# Patient Record
(demographics unavailable — no encounter records)

---

## 2025-06-21 NOTE — END OF VISIT
[] : Fellow [Fellow] : Fellow [FreeTextEntry3] :  I personally saw and examined this patient with the fellow physician and was present for the key portions of history taking, examination as well as the Mohs procedures performed .  I agree with the assessment and plan as documented in the fellow's note unless noted below.

## 2025-06-21 NOTE — PHYSICAL EXAM
[Alert] : alert [Oriented x 3] : ~L oriented x 3 [Well Nourished] : well nourished [Conjunctiva Non-injected] : conjunctiva non-injected [No Visual Lymphadenopathy] : no visual  lymphadenopathy [No Clubbing] : no clubbing [No Edema] : no edema [No Bromhidrosis] : no bromhidrosis [No Chromhidrosis] : no chromhidrosis [Hair] : Hair [Scalp] : Scalp [Face] : Face [Nose] : Nose [Eyelids] : Eyelids [Ears] : Ears [R Leg] : R Leg [L Leg] : L Leg [FreeTextEntry3] : Left proximal pretibial - 4.1 cmx 3.0 cm pink crusted plaque No regional LAD

## 2025-06-21 NOTE — PROCEDURE
[TextEntry] : Mohs Surgery Procedure Note   A surgical time out was taken to confirm the patient's correct identity and the correct site. The operative site was identified by the patient and surgical team prior to surgery and the patient agreed to proceed with the surgical site which was marked prior to anesthesia infiltration.   Mohs Micrographic Surgery Report Date Collected: 06/18/2025 Date Received: Same as above Date Verified: Same as above Attending Surgeon: Kasia Ledezma MD Fellow: Salud Martinez MD Assistants: Kyrie Segal RN, Maryuri Tang MA,  Sonia Mcfarland MA Procedure#:  Diagnosis: SCC Location: left proximal pretibial Pre-op Size: 4.1 cmx 3.0 cm Post-Operative Final Defect Size: 5.0 cm x  5.0  cm Stages (number of pieces per stage):1 (4/1) Pathology, if tumor noted in stages: Debulk with atypical keratinocytic proliferation c/w invasive SCC. Stage I with Sparse perivascular lymphocytic infiltrate without evidence of residual carcinoma on sections examined Indications for procedure: Ill-defined tumor margins, high-priority anatomic location for preservation of normal tissue, aggressive histologic nature of the tumor Repair type: N/A (second intent) Total volume of anesthesia: 6 mL   Mohs Procedure Report:   The patient was escorted to the outpatient surgical operatory. The proposed Mohs procedure and reconstruction options were discussed with the patient. The risks, benefits, and alternatives were discussed and the patient signed a written consent form. A time out was taken to confirm the patient's identity and the exact location of the skin cancer. After the patient was placed in a recumbent position, the surgical site was cleaned with alcohol and either Betadine (for eyes and ears) or chlorhexidine gluconate, draped, and infiltrated with 0.5% lidocaine with 1:200,000 epinephrine local anesthetic. A sterile surgical marking pen was used to outline a thin margin of normal-appearing skin around the tumor. A beveled incision was then made using a scalpel. Small orienting nicks were made on the specimen and the surrounding skin. The tissue was then sharply dissected from the surrounding skin. Hemostasis was maintained with the electrosurgical unit and/or pressure. A temporary dressing was placed on the surgical defect until the frozen section slides were interpreted. The oriented specimen was placed in a Deion dish and transported to the Mohs laboratory. For each stage of the procedure, a visual representation of the specimen was drawn on a Mohs map. This map graphically depicts the specimen's two-dimensional appearance, orientation, and tissue preparation, which consists of dividing the specimen and applying tissue dyes. Because the deep and peripheral portions of the tissue are then embedded in the same geometric plane, the map also represents an oriented scale drawing of the resulting histologic sections. The Mohs technician then prepared frozen section slides using standard techniques. The slides were stained with hematoxylin and eosin, and cover slips were applied. The frozen section slides were then examined under the microscope. If tumor was found, it was localized on the map. The orienting nicks on the original specimen corresponded to similar nicks on the surgical defect so areas of identified tumor could be mapped back to the patient and resected. Additional layers of removed skin were then processed as indicated above. This iterative process continued as applicable until no tumor was observed microscopically. At this stage, the Mohs resection was complete.   Second Intention Healing After the surgical procedure was completed, the patient was brought back to the minor surgery operatory. Various reconstructive modalities were discussed with the patient. Second intention healing was selected as the best option. A pressure dressing composed of Vaseline ointment, Telfa, and sterile gauze and was securely taped into place. The patient was given complete verbal and written wound care instructions and was asked to follow up for a wound check as indicated. The patient ambulated from the minor surgery operatory without problems.

## 2025-06-21 NOTE — ASSESSMENT
[FreeTextEntry1] : Mohs surgery for SCC left proximal pretibial -- 1 stage(s) of Mohs surgery performed 06/18/2025 -- Heal by second intent  -- f/u for wound check 1 week at time of second Mohs procedure -- Augmentin bid x 7 days -- f/u for routine skin exams as previously recommended by Her referring dermatologist.   Thank you for this Mohs surgery referral.   Reason MD [place] Physician, Dermatology & Dermatologic Surgery [facility].

## 2025-06-21 NOTE — HISTORY OF PRESENT ILLNESS
[FreeTextEntry1] : Invasive SCC left proximal pretibial region [de-identified] : 06/18/2025   Referred by: Dr. Thomas   We had the pleasure of seeing your patient in consultation for Mohs Micrographic Surgery.  Ms. TANYA CASALINI VALENCIA is a 57 year old F who presents for SCC left proximal pretibial region. Bx done 5/15/2025 showing invasive SCC, had been a quickly growing nodule x weeks prior to biopsy. No treatment to date.  -of note, has a separate BCC chest scheduled for Mohs next week   Pertinent positives noted below History of HIV or hepatitis: No Blood thinners: None Antibiotic Prophylaxis: None Medical implants: None   The patient's review of systems questionnaire was reviewed. Education needs were identified. There were no barriers to learning.   Mohs surgery consultation for SCC left proximal pretibial region   -- I explained the treatment options of topical immunomodulatory or chemotherapy, electrodessication and curettage, radiation therapy, excision and Mohs surgery. I recommended Mohs surgery due to the tumor type, location, and ill-defined nature of cancer.   Mohs Appropriate Use Criteria (AUC) Score: 8   I explained that following extirpation there will be a full thickness defect of the involved area. The reconstructive options will be based on the defect size and surrounding tissue laxity of the involved area. Primary closure is only possible for smaller defects. For larger defects, local tissue rearrangement or skin grafting may be necessary. Risks following layered primary closure or local tissue rearrangement include wound dehiscence, contour irregularity, bleeding, infection, and paresthesia (nerve damage including sensory deficit or motor weakness). Risks following skin grafting include wound dehiscence, skin graft nonadherence (partial or complete), contour irregularity, bleeding, infection, paresthesia, and donor site complications. I explained that the patient will need to abstain from physical activity for 1-2 weeks following the surgery, that they would heal with a scar, and also discussed the chances of infection, bleeding, potential sensory or motor nerve damage, and recurrence. The patient indicated that s/he understood the risks and wished to proceed today -- In particular, for reconstruction we discussed healing via second intent   Thank you for this Mohs surgery referral. We recommended that Ms. TANYA CASALINI VALENCIA follow up with Her referring dermatologist for routine skin exams following surgery.

## 2025-06-21 NOTE — HISTORY OF PRESENT ILLNESS
[FreeTextEntry1] : Invasive SCC left proximal pretibial region [de-identified] : 06/18/2025   Referred by: Dr. Thomas   We had the pleasure of seeing your patient in consultation for Mohs Micrographic Surgery.  Ms. TANYA CASALINI VALENCIA is a 57 year old F who presents for SCC left proximal pretibial region. Bx done 5/15/2025 showing invasive SCC, had been a quickly growing nodule x weeks prior to biopsy. No treatment to date.  -of note, has a separate BCC chest scheduled for Mohs next week   Pertinent positives noted below History of HIV or hepatitis: No Blood thinners: None Antibiotic Prophylaxis: None Medical implants: None   The patient's review of systems questionnaire was reviewed. Education needs were identified. There were no barriers to learning.   Mohs surgery consultation for SCC left proximal pretibial region   -- I explained the treatment options of topical immunomodulatory or chemotherapy, electrodessication and curettage, radiation therapy, excision and Mohs surgery. I recommended Mohs surgery due to the tumor type, location, and ill-defined nature of cancer.   Mohs Appropriate Use Criteria (AUC) Score: 8   I explained that following extirpation there will be a full thickness defect of the involved area. The reconstructive options will be based on the defect size and surrounding tissue laxity of the involved area. Primary closure is only possible for smaller defects. For larger defects, local tissue rearrangement or skin grafting may be necessary. Risks following layered primary closure or local tissue rearrangement include wound dehiscence, contour irregularity, bleeding, infection, and paresthesia (nerve damage including sensory deficit or motor weakness). Risks following skin grafting include wound dehiscence, skin graft nonadherence (partial or complete), contour irregularity, bleeding, infection, paresthesia, and donor site complications. I explained that the patient will need to abstain from physical activity for 1-2 weeks following the surgery, that they would heal with a scar, and also discussed the chances of infection, bleeding, potential sensory or motor nerve damage, and recurrence. The patient indicated that s/he understood the risks and wished to proceed today -- In particular, for reconstruction we discussed healing via second intent   Thank you for this Mohs surgery referral. We recommended that Ms. TANYA CASALINI VALENCIA follow up with Her referring dermatologist for routine skin exams following surgery.

## 2025-06-21 NOTE — ASSESSMENT
[FreeTextEntry1] : Mohs surgery for SCC left proximal pretibial -- 1 stage(s) of Mohs surgery performed 06/18/2025 -- Heal by second intent  -- f/u for wound check 1 week at time of second Mohs procedure -- Augmentin bid x 7 days -- f/u for routine skin exams as previously recommended by Her referring dermatologist.   Thank you for this Mohs surgery referral.   Reason MD [place] Physician, Dermatology & Dermatologic Surgery [facility].

## 2025-06-24 NOTE — ASSESSMENT
[FreeTextEntry1] : Mohs surgery for SCC left proximal pretibial -- 1 stage(s) of Mohs surgery performed 06/18/2025 -- Heal by second intent  -- f/u for wound check 1 week at time of second Mohs procedure -- Augmentin bid x 7 days -- f/u for routine skin exams as previously recommended by Her referring dermatologist.   Thank you for this Mohs surgery referral.   Reason MD [place] Physician, Dermatology & Dermatologic Surgery [facility].

## 2025-06-24 NOTE — HISTORY OF PRESENT ILLNESS
[FreeTextEntry1] : BCC chest  [de-identified] : 06/24/2025   Referred by: Dr. Thomas   We had the pleasure of seeing your patient for Mohs Micrographic Surgery.  Ms. TANYA CASALINI VALENCIA is a 57 year old F who presents for BCC chest   s/p Mohs for SCC left proximal pretibial region 6/18/2025. Bx done 5/15/2025 showing invasive SCC, had been a quickly growing nodule x weeks prior to biopsy. No treatment to date.   Pertinent positives noted below History of HIV or hepatitis: No Blood thinners: None Antibiotic Prophylaxis: None Medical implants: None   The patient's review of systems questionnaire was reviewed. Education needs were identified. There were no barriers to learning.   Mohs surgery consultation for BCC chest   -- I explained the treatment options of topical immunomodulatory or chemotherapy, electrodessication and curettage, radiation therapy, excision and Mohs surgery. I recommended Mohs surgery due to the tumor type, location, and ill-defined nature of cancer.   Mohs Appropriate Use Criteria (AUC) Score: 8   I explained that following extirpation there will be a full thickness defect of the involved area. The reconstructive options will be based on the defect size and surrounding tissue laxity of the involved area. Primary closure is only possible for smaller defects. For larger defects, local tissue rearrangement or skin grafting may be necessary. Risks following layered primary closure or local tissue rearrangement include wound dehiscence, contour irregularity, bleeding, infection, and paresthesia (nerve damage including sensory deficit or motor weakness). Risks following skin grafting include wound dehiscence, skin graft nonadherence (partial or complete), contour irregularity, bleeding, infection, paresthesia, and donor site complications. I explained that the patient will need to abstain from physical activity for 1-2 weeks following the surgery, that they would heal with a scar, and also discussed the chances of infection, bleeding, potential sensory or motor nerve damage, and recurrence. The patient indicated that s/he understood the risks and wished to proceed today -- In particular, for reconstruction we discussed via linear    Thank you for this Mohs surgery referral. We recommended that Ms. TANYA CASALINI VALENCIA follow up with Her referring dermatologist for routine skin exams following surgery.

## 2025-06-24 NOTE — PHYSICAL EXAM
[Alert] : alert [Oriented x 3] : ~L oriented x 3 [Well Nourished] : well nourished [Conjunctiva Non-injected] : conjunctiva non-injected [No Visual Lymphadenopathy] : no visual  lymphadenopathy [No Clubbing] : no clubbing [No Edema] : no edema [No Bromhidrosis] : no bromhidrosis [No Chromhidrosis] : no chromhidrosis [Hair] : Hair [Scalp] : Scalp [Face] : Face [Nose] : Nose [Eyelids] : Eyelids [Ears] : Ears [R Leg] : R Leg [L Leg] : L Leg [FreeTextEntry3] : Left proximal pretibial -scar R chest No regional LAD

## 2025-06-24 NOTE — PROCEDURE
[TextEntry] : Mohs Surgery Procedure Note   A surgical time out was taken to confirm the patient's correct identity and the correct site. The operative site was identified by the patient and surgical team prior to surgery and the patient agreed to proceed with the surgical site which was marked prior to anesthesia infiltration.   Mohs Micrographic Surgery Report Date Collected: 06/24/2025 Date Received: Same as above Date Verified: Same as above Attending Surgeon: Kasia Ledezma MD Fellow: Salud Martinez MD Assistants: Kyrie Segal RN, Maryuri Tang MA,  Sonia Mcfarland MA Procedure#: *** Diagnosis: *** Location: *** Pre-op Size: ***cm Post-Operative Final Defect Size: *** cm Stages (number of pieces per stage): *** (***/***) Pathology, if tumor noted in stages: Debulk with ***. Stage I with Sparse perivascular lymphocytic infiltrate without evidence of residual carcinoma on sections examined Indications for procedure: Ill-defined tumor margins, high-priority anatomic location for preservation of normal tissue, aggressive histologic nature of the tumor Repair type: *** / Repair to be performed by  *** Subcutaneous and dermal sutures used: *** Monocryl/Vicryl.     N/A - Repair to be performed by  *** Epidermal sutures: *** Prolene/Chromic N/A - Repair to be performed by  *** Total volume of anesthesia: *** mL Repair size or area: *** cm   Mohs Procedure Report:   The patient was escorted to the outpatient surgical operatory. The proposed Mohs procedure and reconstruction options were discussed with the patient. The risks, benefits, and alternatives were discussed and the patient signed a written consent form. A time out was taken to confirm the patient's identity and the exact location of the skin cancer. After the patient was placed in a recumbent position, the surgical site was cleaned with alcohol and either Betadine (for eyes and ears) or chlorhexidine gluconate, draped, and infiltrated with 0.5% lidocaine with 1:200,000 epinephrine local anesthetic. A sterile surgical marking pen was used to outline a thin margin of normal-appearing skin around the tumor. A beveled incision was then made using a scalpel. Small orienting nicks were made on the specimen and the surrounding skin. The tissue was then sharply dissected from the surrounding skin. Hemostasis was maintained with the electrosurgical unit and/or pressure. A temporary dressing was placed on the surgical defect until the frozen section slides were interpreted. The oriented specimen was placed in a Deion dish and transported to the Mohs laboratory. For each stage of the procedure, a visual representation of the specimen was drawn on a Mohs map. This map graphically depicts the specimen's two-dimensional appearance, orientation, and tissue preparation, which consists of dividing the specimen and applying tissue dyes. Because the deep and peripheral portions of the tissue are then embedded in the same geometric plane, the map also represents an oriented scale drawing of the resulting histologic sections. The Mohs technician then prepared frozen section slides using standard techniques. The slides were stained with hematoxylin and eosin, and cover slips were applied. The frozen section slides were then examined under the microscope. If tumor was found, it was localized on the map. The orienting nicks on the original specimen corresponded to similar nicks on the surgical defect so areas of identified tumor could be mapped back to the patient and resected. Additional layers of removed skin were then processed as indicated above. This iterative process continued as applicable until no tumor was observed microscopically. At this stage, the Mohs resection was complete.

## 2025-07-10 NOTE — HISTORY OF PRESENT ILLNESS
[FreeTextEntry1] : Wound check s/p Mohs surgery for invasive SCC left proximal pretibial region 6/18/ [de-identified] : 07/09/2025   Ms. TANYA CASALINI VALENCIA is a 57 year old F who presents for wound check s/p Mohs surgery for SCC left proximal pretibial region 6/18/2025. Has been covering with mupirocin and nonstick dressing. Notes clear liquid drainage from the wound, no pain.    Patient denies fever, chills, night sweats, unintentional weight loss or other constitutional symptoms

## 2025-07-10 NOTE — END OF VISIT
[] : Fellow [Fellow] : Fellow [FreeTextEntry3] : I personally saw and examined this patient with the fellow physician and was present for the key portions of history taking, examination as well as any procedures performed .  I agree with the assessment and plan as documented in the resident's note unless noted below.   Kasia Ledezma MD Physician, Dermatology and Dermatologic Surgery [facility]

## 2025-07-10 NOTE — ASSESSMENT
[FreeTextEntry1] : Wound Check s/p Mohs surgery for SCC left proximal pretibial -- 1 stage(s) of Mohs surgery performed 06/18/2025 -wound edges gently debrided with curette today -- referral to Wound Care placed for dressing and wound care recommendations -RTC in 3 weeks -- f/u for routine skin exams as previously recommended by Her referring dermatologist.   Thank you for this Mohs surgery referral.   Kasia Ledezma MD Physician, Dermatology & Dermatologic Surgery [facility].

## 2025-07-10 NOTE — PHYSICAL EXAM
[Alert] : alert [Oriented x 3] : ~L oriented x 3 [Well Nourished] : well nourished [Conjunctiva Non-injected] : conjunctiva non-injected [No Visual Lymphadenopathy] : no visual  lymphadenopathy [No Clubbing] : no clubbing [No Edema] : no edema [No Bromhidrosis] : no bromhidrosis [No Chromhidrosis] : no chromhidrosis [Hair] : Hair [Scalp] : Scalp [Face] : Face [Nose] : Nose [Eyelids] : Eyelids [Ears] : Ears [R Leg] : R Leg [L Leg] : L Leg [FreeTextEntry3] : Left proximal pretibial wound with granulation tissue, clear serous drainage -exudate at wound edges debrided with a curette 1+ edema bilateral lower legs

## 2025-07-10 NOTE — END OF VISIT
[] : Fellow [Fellow] : Fellow [FreeTextEntry3] : I personally saw and examined this patient with the fellow physician and was present for the key portions of history taking, examination as well as any procedures performed .  I agree with the assessment and plan as documented in the resident's note unless noted below.   Kasia Ledezma MD Physician, Dermatology and Dermatologic Surgery [facility]

## 2025-07-10 NOTE — ASSESSMENT
[FreeTextEntry1] : Wound Check s/p Mohs surgery for SCC left proximal pretibial -- 1 stage(s) of Mohs surgery performed 06/18/2025 -wound edges gently debrided with curette today -- referral to Wound Care placed for dressing and wound care recommendations -RTC in 3 weeks -- f/u for routine skin exams as previously recommended by Her referring dermatologist.   Thank you for this Mohs surgery referral.   Kasia Ledezma MD Physician, Dermatology & Dermatologic Surgery [facility].

## 2025-07-10 NOTE — HISTORY OF PRESENT ILLNESS
[FreeTextEntry1] : Wound check s/p Mohs surgery for invasive SCC left proximal pretibial region 6/18/ [de-identified] : 07/09/2025   Ms. TANYA CASALINI VALENCIA is a 57 year old F who presents for wound check s/p Mohs surgery for SCC left proximal pretibial region 6/18/2025. Has been covering with mupirocin and nonstick dressing. Notes clear liquid drainage from the wound, no pain.    Patient denies fever, chills, night sweats, unintentional weight loss or other constitutional symptoms

## 2025-07-16 NOTE — ASSESSMENT
[FreeTextEntry1] : 1) Mohs surgery for BCC right medial superior chest -- 1 stage(s) of Mohs surgery performed 07/16/2025 -- Primary intermediate repair performed -- f/u for wound check PRN -- Mupirocin oint bid -- f/u for routine skin exams as previously recommended by Her referring dermatologist.   2) Wound Check s/p Mohs surgery for SCC left proximal pretibial -- 1 stage(s) of Mohs surgery performed 06/18/2025 -- Healing by second intent  -wound with some exudate, gently debrided today -- recommended Wound Care consultation to discuss recommendations to optimize healing -RTC in 3 weeks -- f/u for routine skin exams as previously recommended by Her referring dermatologist.   Thank you for this Mohs surgery referral.   Kasia Ledezma MD Physician, Dermatology & Dermatologic Surgery [facility].

## 2025-07-16 NOTE — ASSESSMENT
[FreeTextEntry1] : 1) Mohs surgery for BCC right medial superior chest -- 1 stage(s) of Mohs surgery performed 07/16/2025 -- Primary intermediate repair performed -- f/u for wound check PRN -- Mupirocin oint bid -- f/u for routine skin exams as previously recommended by Her referring dermatologist.   2) Wound Check s/p Mohs surgery for SCC left proximal pretibial -- 1 stage(s) of Mohs surgery performed 06/18/2025 -- Healing by second intent  -wound with some exudate, gently debrided today -- recommended Wound Care consultation to discuss recommendations to optimize healing -RTC in 3 weeks -- f/u for routine skin exams as previously recommended by Her referring dermatologist.   Thank you for this Mohs surgery referral.   Kasia Ledezma MD Physician, Dermatology & Dermatologic Surgery [facility].

## 2025-07-16 NOTE — HISTORY OF PRESENT ILLNESS
[FreeTextEntry1] : Infiltrative BCC Right Medial Superior chest, Wound check s/p Mohs and second intent for SCC left distal pretibial 6/18/25 [de-identified] : 07/16/2025   Referred by: Dr. Thomas   We had the pleasure of seeing your patient for Mohs Micrographic Surgery.  Ms. TANYA CASALINI VALENCIA is a 57 year old F who presents for Mohs for an infiltrative BCC right medial superior chest. Had been there as a pink bump prior to bx for several mos. No treatment to date.  She is also recently s/p Mohs for SCC left proximal pretibial region 6/18/2025. Healing via second intent, keeping covered with mupirocin . Notes it is filling in well but has some clear drainage.   Pertinent positives noted below History of HIV or hepatitis: No Blood thinners: None Antibiotic Prophylaxis: None Medical implants: None   The patient's review of systems questionnaire was reviewed. Education needs were identified. There were no barriers to learning.   Mohs surgery consultation for Infiltrative BCC right medial superior chest   -- I explained the treatment options of topical immunomodulatory or chemotherapy, electrodessication and curettage, radiation therapy, excision and Mohs surgery. I recommended Mohs surgery due to the tumor type, location, and ill-defined nature of cancer.   Mohs Appropriate Use Criteria (AUC) Score: 8   I explained that following extirpation there will be a full thickness defect of the involved area. The reconstructive options will be based on the defect size and surrounding tissue laxity of the involved area. Primary closure is only possible for smaller defects. For larger defects, local tissue rearrangement or skin grafting may be necessary. Risks following layered primary closure or local tissue rearrangement include wound dehiscence, contour irregularity, bleeding, infection, and paresthesia (nerve damage including sensory deficit or motor weakness). Risks following skin grafting include wound dehiscence, skin graft nonadherence (partial or complete), contour irregularity, bleeding, infection, paresthesia, and donor site complications. I explained that the patient will need to abstain from physical activity for 1-2 weeks following the surgery, that they would heal with a scar, and also discussed the chances of infection, bleeding, potential sensory or motor nerve damage, and recurrence. The patient indicated that s/he understood the risks and wished to proceed today -- In particular, for reconstruction we discussed linear closure   Thank you for this Mohs surgery referral. We recommended that Ms. TANYA CASALINI VALENCIA follow up with Her referring dermatologist for routine skin exams following surgery.

## 2025-07-16 NOTE — PHYSICAL EXAM
[Alert] : alert [Oriented x 3] : ~L oriented x 3 [Well Nourished] : well nourished [Conjunctiva Non-injected] : conjunctiva non-injected [No Visual Lymphadenopathy] : no visual  lymphadenopathy [No Clubbing] : no clubbing [No Edema] : no edema [No Bromhidrosis] : no bromhidrosis [No Chromhidrosis] : no chromhidrosis [Hair] : Hair [Scalp] : Scalp [Face] : Face [Nose] : Nose [Eyelids] : Eyelids [Ears] : Ears [R Leg] : R Leg [L Leg] : L Leg [FreeTextEntry3] : Right medial superior chest - 2.3 x 1.4 cm pink plaque No regional LAD  -left pretibial with healing 3.5 cm x 3.2 cm wound, shallower than prior with abundant granulation tissue -exudate on the wound gently debrided with curette today

## 2025-07-16 NOTE — PROCEDURE
[TextEntry] : Mohs Surgery Procedure Note   A surgical time out was taken to confirm the patient's correct identity and the correct site. The operative site was identified by the patient and surgical team prior to surgery and the patient agreed to proceed with the surgical site which was marked prior to anesthesia infiltration.   Mohs Micrographic Surgery Report Date Collected: 07/16/2025 Date Received: Same as above Date Verified: Same as above Attending Surgeon: Kasia Ledezma MD Fellow: Salud Martinez MD Assistants: Kyrie Segal RN, Maryuri Tang MA,  Sonia Mcfarland MA Procedure#:  Diagnosis: BCC Location: Right medial superior chest Pre-op Size: 2.3 cm x 1.4 cm Post-Operative Final Defect Size: 4.0 cm x 3.1 cm Stages (number of pieces per stage): 1 (2/1) Pathology, if tumor noted in stages: Debulk with atypical basaloid proliferation c/w BCC. Stage I with Sparse perivascular lymphocytic infiltrate without evidence of residual carcinoma on sections examined Indications for procedure: Ill-defined tumor margins, high-priority anatomic location for preservation of normal tissue, aggressive histologic nature of the tumor Repair type: Primary intermediate linear layered Subcutaneous and dermal sutures used: 3-0 Vicryl.      Epidermal sutures: 5-0 fast gut Total volume of anesthesia: 12 mL Repair length: 7.2 cm   Mohs Procedure Report:   The patient was escorted to the outpatient surgical operatory. The proposed Mohs procedure and reconstruction options were discussed with the patient. The risks, benefits, and alternatives were discussed and the patient signed a written consent form. A time out was taken to confirm the patient's identity and the exact location of the skin cancer. After the patient was placed in a recumbent position, the surgical site was cleaned with alcohol and either Betadine (for eyes and ears) or chlorhexidine gluconate, draped, and infiltrated with 0.5% lidocaine with 1:200,000 epinephrine local anesthetic. A sterile surgical marking pen was used to outline a thin margin of normal-appearing skin around the tumor. A beveled incision was then made using a scalpel. Small orienting nicks were made on the specimen and the surrounding skin. The tissue was then sharply dissected from the surrounding skin. Hemostasis was maintained with the electrosurgical unit and/or pressure. A temporary dressing was placed on the surgical defect until the frozen section slides were interpreted. The oriented specimen was placed in a Deion dish and transported to the Mohs laboratory. For each stage of the procedure, a visual representation of the specimen was drawn on a Mohs map. This map graphically depicts the specimen's two-dimensional appearance, orientation, and tissue preparation, which consists of dividing the specimen and applying tissue dyes. Because the deep and peripheral portions of the tissue are then embedded in the same geometric plane, the map also represents an oriented scale drawing of the resulting histologic sections. The Mohs technician then prepared frozen section slides using standard techniques. The slides were stained with hematoxylin and eosin, and cover slips were applied. The frozen section slides were then examined under the microscope. If tumor was found, it was localized on the map. The orienting nicks on the original specimen corresponded to similar nicks on the surgical defect so areas of identified tumor could be mapped back to the patient and resected. Additional layers of removed skin were then processed as indicated above. This iterative process continued as applicable until no tumor was observed microscopically. At this stage, the Mohs resection was complete.   RECONSTRUCTION PROCEDURE NOTE INTERMEDIATE LINEAR LAYERED CLOSURE   Prior to beginning the procedure, patient identity was verified, as well as the procedure to be performed and the site.  All equipment required was ready and available. The patient was positioned appropriately.   INDICATIONS:  Various reconstructive modalities were discussed with the patient, and it was decided that an intermediate linear layered closure would best preserve normal anatomical and functional relationships. After a discussion of the risks including but not limited to bleeding, scarring, infection, nerve damage, unsatisfactory results, and wound dehiscense, informed consent was obtained, and the patient underwent the procedure as follows.   PROCEDURE:  The patient was taken to the operative suite and placed supine on the operating room table. The area was anesthetized with 1% Lidocaine with 1/100,000 epinephrine. The area was washed with Chlorhexidine or Betadine, rinsed with sterile saline, and draped with sterile towels. The wound edges were debeveled, and the wound was undermined widely in all directions if needed. Hemostasis was obtained with spot electrodessication if needed. Deep dermal and subcutaneous closure was performed using the indicated buried sutures.  Using a 15 blade scalpel, redundant cones were removed as Burow's triangles to align with the relaxed skin tension lines in a curvilinear fashion if needed. The epidermis was closed and approximated using the indicated sutures. The final wound length is noted above. A sterile pressure dressing was applied, and wound care instructions were given.   FINAL PROCEDURE: Intermediate linear layered closure   COMPLICATIONS: None

## 2025-07-16 NOTE — END OF VISIT
[] : Fellow [Fellow] : Fellow [FreeTextEntry3] :  I personally saw and examined this patient with the fellow physician and was present for the key portions of history taking, examination as well as the Mohs procedures performed .  I agree with the assessment and plan as documented in the fellow's note unless noted below.  No

## 2025-07-16 NOTE — HISTORY OF PRESENT ILLNESS
[FreeTextEntry1] : Infiltrative BCC Right Medial Superior chest, Wound check s/p Mohs and second intent for SCC left distal pretibial 6/18/25 [de-identified] : 07/16/2025   Referred by: Dr. Thomas   We had the pleasure of seeing your patient for Mohs Micrographic Surgery.  Ms. TANYA CASALINI VALENCIA is a 57 year old F who presents for Mohs for an infiltrative BCC right medial superior chest. Had been there as a pink bump prior to bx for several mos. No treatment to date.  She is also recently s/p Mohs for SCC left proximal pretibial region 6/18/2025. Healing via second intent, keeping covered with mupirocin . Notes it is filling in well but has some clear drainage.   Pertinent positives noted below History of HIV or hepatitis: No Blood thinners: None Antibiotic Prophylaxis: None Medical implants: None   The patient's review of systems questionnaire was reviewed. Education needs were identified. There were no barriers to learning.   Mohs surgery consultation for Infiltrative BCC right medial superior chest   -- I explained the treatment options of topical immunomodulatory or chemotherapy, electrodessication and curettage, radiation therapy, excision and Mohs surgery. I recommended Mohs surgery due to the tumor type, location, and ill-defined nature of cancer.   Mohs Appropriate Use Criteria (AUC) Score: 8   I explained that following extirpation there will be a full thickness defect of the involved area. The reconstructive options will be based on the defect size and surrounding tissue laxity of the involved area. Primary closure is only possible for smaller defects. For larger defects, local tissue rearrangement or skin grafting may be necessary. Risks following layered primary closure or local tissue rearrangement include wound dehiscence, contour irregularity, bleeding, infection, and paresthesia (nerve damage including sensory deficit or motor weakness). Risks following skin grafting include wound dehiscence, skin graft nonadherence (partial or complete), contour irregularity, bleeding, infection, paresthesia, and donor site complications. I explained that the patient will need to abstain from physical activity for 1-2 weeks following the surgery, that they would heal with a scar, and also discussed the chances of infection, bleeding, potential sensory or motor nerve damage, and recurrence. The patient indicated that s/he understood the risks and wished to proceed today -- In particular, for reconstruction we discussed linear closure   Thank you for this Mohs surgery referral. We recommended that Ms. TANYA CASALINI VALENCIA follow up with Her referring dermatologist for routine skin exams following surgery.